# Patient Record
Sex: FEMALE | Race: WHITE | HISPANIC OR LATINO | Employment: STUDENT | URBAN - METROPOLITAN AREA
[De-identification: names, ages, dates, MRNs, and addresses within clinical notes are randomized per-mention and may not be internally consistent; named-entity substitution may affect disease eponyms.]

---
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
## 2021-04-12 ENCOUNTER — PATIENT MESSAGE (OUTPATIENT)
Dept: RESEARCH | Facility: HOSPITAL | Age: 21
End: 2021-04-12

## 2021-07-02 ENCOUNTER — OFFICE VISIT (OUTPATIENT)
Dept: SPORTS MEDICINE | Facility: CLINIC | Age: 21
End: 2021-07-02
Payer: MEDICAID

## 2021-07-02 ENCOUNTER — TELEPHONE (OUTPATIENT)
Dept: SPORTS MEDICINE | Facility: CLINIC | Age: 21
End: 2021-07-02

## 2021-07-02 ENCOUNTER — HOSPITAL ENCOUNTER (OUTPATIENT)
Dept: RADIOLOGY | Facility: HOSPITAL | Age: 21
Discharge: HOME | End: 2021-07-02
Attending: FAMILY MEDICINE
Payer: MEDICAID

## 2021-07-02 VITALS — BODY MASS INDEX: 29.79 KG/M2 | WEIGHT: 179 LBS | TEMPERATURE: 97 F

## 2021-07-02 DIAGNOSIS — M79.642 CHRONIC HAND PAIN, LEFT: ICD-10-CM

## 2021-07-02 DIAGNOSIS — R29.898 DECREASED GRIP STRENGTH OF LEFT HAND: ICD-10-CM

## 2021-07-02 DIAGNOSIS — M25.632 DECREASED RANGE OF MOTION OF LEFT WRIST: ICD-10-CM

## 2021-07-02 DIAGNOSIS — G89.29 CHRONIC HAND PAIN, LEFT: ICD-10-CM

## 2021-07-02 DIAGNOSIS — G89.29 CHRONIC PAIN OF LEFT WRIST: Primary | ICD-10-CM

## 2021-07-02 DIAGNOSIS — M25.532 CHRONIC PAIN OF LEFT WRIST: Primary | ICD-10-CM

## 2021-07-02 DIAGNOSIS — R52 MECHANICAL PAIN: ICD-10-CM

## 2021-07-02 PROCEDURE — 73130 X-RAY EXAM OF HAND: CPT | Mod: TC,LT

## 2021-07-02 PROCEDURE — 73130 X-RAY EXAM OF HAND: CPT | Mod: 26,LT,, | Performed by: RADIOLOGY

## 2021-07-02 PROCEDURE — 99999 PR PBB SHADOW E&M-EST. PATIENT-LVL III: CPT | Mod: PBBFAC,,, | Performed by: FAMILY MEDICINE

## 2021-07-02 PROCEDURE — 99214 OFFICE O/P EST MOD 30 MIN: CPT | Mod: S$PBB,,, | Performed by: FAMILY MEDICINE

## 2021-07-02 PROCEDURE — 99999 PR PBB SHADOW E&M-EST. PATIENT-LVL III: ICD-10-PCS | Mod: PBBFAC,,, | Performed by: FAMILY MEDICINE

## 2021-07-02 PROCEDURE — 73130 XR HAND COMPLETE 3 VIEW LEFT: ICD-10-PCS | Mod: 26,LT,, | Performed by: RADIOLOGY

## 2021-07-02 PROCEDURE — 99214 PR OFFICE/OUTPT VISIT, EST, LEVL IV, 30-39 MIN: ICD-10-PCS | Mod: S$PBB,,, | Performed by: FAMILY MEDICINE

## 2021-07-02 PROCEDURE — 99213 OFFICE O/P EST LOW 20 MIN: CPT | Mod: PBBFAC | Performed by: FAMILY MEDICINE

## 2021-07-07 ENCOUNTER — TELEPHONE (OUTPATIENT)
Dept: SPORTS MEDICINE | Facility: CLINIC | Age: 21
End: 2021-07-07

## 2021-07-10 ENCOUNTER — HOSPITAL ENCOUNTER (OUTPATIENT)
Dept: RADIOLOGY | Facility: OTHER | Age: 21
Discharge: HOME | End: 2021-07-10
Attending: FAMILY MEDICINE
Payer: MEDICAID

## 2021-07-10 DIAGNOSIS — R52 MECHANICAL PAIN: ICD-10-CM

## 2021-07-10 DIAGNOSIS — G89.29 CHRONIC HAND PAIN, LEFT: ICD-10-CM

## 2021-07-10 DIAGNOSIS — M79.642 CHRONIC HAND PAIN, LEFT: ICD-10-CM

## 2021-07-10 PROCEDURE — 73221 MRI JOINT UPR EXTREM W/O DYE: CPT | Mod: TC,LT

## 2021-07-10 PROCEDURE — 73221 MRI WRIST WITHOUT CONTRAST LEFT: ICD-10-PCS | Mod: 26,LT,, | Performed by: RADIOLOGY

## 2021-07-10 PROCEDURE — 73221 MRI JOINT UPR EXTREM W/O DYE: CPT | Mod: 26,LT,, | Performed by: RADIOLOGY

## 2021-07-12 ENCOUNTER — OFFICE VISIT (OUTPATIENT)
Dept: SPORTS MEDICINE | Facility: CLINIC | Age: 21
End: 2021-07-12
Payer: MEDICAID

## 2021-07-12 DIAGNOSIS — Z00.00 EXAMINATION: Primary | ICD-10-CM

## 2021-07-12 PROCEDURE — 99499 NO LOS: ICD-10-PCS | Mod: 95,,, | Performed by: FAMILY MEDICINE

## 2021-07-12 PROCEDURE — 99499 UNLISTED E&M SERVICE: CPT | Mod: 95,,, | Performed by: FAMILY MEDICINE

## 2021-07-14 ENCOUNTER — TELEPHONE (OUTPATIENT)
Dept: SPORTS MEDICINE | Facility: CLINIC | Age: 21
End: 2021-07-14

## 2023-07-08 ENCOUNTER — HOSPITAL ENCOUNTER (EMERGENCY)
Facility: HOSPITAL | Age: 23
Discharge: HOME | End: 2023-07-08
Attending: EMERGENCY MEDICINE
Payer: MEDICAID

## 2023-07-08 VITALS
HEIGHT: 67 IN | TEMPERATURE: 99 F | BODY MASS INDEX: 27.47 KG/M2 | SYSTOLIC BLOOD PRESSURE: 118 MMHG | OXYGEN SATURATION: 98 % | HEART RATE: 88 BPM | RESPIRATION RATE: 16 BRPM | DIASTOLIC BLOOD PRESSURE: 68 MMHG | WEIGHT: 175 LBS

## 2023-07-08 DIAGNOSIS — R50.9 LOW GRADE FEVER: ICD-10-CM

## 2023-07-08 DIAGNOSIS — H60.502 ACUTE OTITIS EXTERNA OF LEFT EAR, UNSPECIFIED TYPE: Primary | ICD-10-CM

## 2023-07-08 DIAGNOSIS — H92.02 OTALGIA OF LEFT EAR: ICD-10-CM

## 2023-07-08 PROCEDURE — 25000003 PHARM REV CODE 250: Performed by: PHYSICIAN ASSISTANT

## 2023-07-08 PROCEDURE — 99283 EMERGENCY DEPT VISIT LOW MDM: CPT

## 2023-07-08 RX ORDER — CIPROFLOXACIN AND DEXAMETHASONE 3; 1 MG/ML; MG/ML
4 SUSPENSION/ DROPS AURICULAR (OTIC)
Status: COMPLETED | OUTPATIENT
Start: 2023-07-08 | End: 2023-07-08

## 2023-07-08 RX ORDER — CIPROFLOXACIN AND DEXAMETHASONE 3; 1 MG/ML; MG/ML
4 SUSPENSION/ DROPS AURICULAR (OTIC) 2 TIMES DAILY
Status: DISCONTINUED | OUTPATIENT
Start: 2023-07-08 | End: 2023-07-08

## 2023-07-08 RX ORDER — CIPROFLOXACIN AND DEXAMETHASONE 3; 1 MG/ML; MG/ML
4 SUSPENSION/ DROPS AURICULAR (OTIC) 2 TIMES DAILY
Qty: 7.5 ML | Refills: 0 | Status: SHIPPED | OUTPATIENT
Start: 2023-07-08 | End: 2023-07-18

## 2023-07-08 RX ORDER — ACETAMINOPHEN 500 MG
1000 TABLET ORAL
Status: COMPLETED | OUTPATIENT
Start: 2023-07-08 | End: 2023-07-08

## 2023-07-08 RX ORDER — NEOMYCIN SULFATE, POLYMYXIN B SULFATE, HYDROCORTISONE 3.5; 10000; 1 MG/ML; [USP'U]/ML; MG/ML
4 SOLUTION/ DROPS AURICULAR (OTIC)
Status: DISCONTINUED | OUTPATIENT
Start: 2023-07-08 | End: 2023-07-08

## 2023-07-08 RX ADMIN — CIPROFLOXACIN AND DEXAMETHASONE 4 DROP: 3; 1 SUSPENSION/ DROPS AURICULAR (OTIC) at 01:07

## 2023-07-08 RX ADMIN — ACETAMINOPHEN 1000 MG: 500 TABLET ORAL at 01:07

## 2023-07-08 NOTE — ED NOTES
Sandra Wyatt, a 22 y.o. female presents to the ED via POV w/ complaint of  left hear pain radiating to left side of jaw that began Thursday. States felt like pimple inside of ear and pain increased since then. Did not take temperature at home, but believes she had fever. AAOx4 and ambulatory to intake without difficulty. Family at chairside. Reports taking aleve for pain last night.     Triage note:  Chief Complaint   Patient presents with    Otalgia     Left ear, radiates to left jaw, onset Thursday, also reports subjective fever yesterday with chills     Review of patient's allergies indicates:  No Known Allergies  Past Medical History:   Diagnosis Date    Hand pain     Migraine

## 2023-07-08 NOTE — ED PROVIDER NOTES
Encounter Date: 2023       History     Chief Complaint   Patient presents with    Otalgia     Left ear, radiates to left jaw, onset Thursday, also reports subjective fever yesterday with chills     Patient is a 22-year-old female.  She has a past medical history significant for chronic migraines and chronic right hand pain status post multiple middle finger surgeries.  She presents to the ER for an urgent evaluation due to acute atraumatic left-sided otalgia.  She states that she began having an earache 2 days ago.  She reports having associated chills.  She tried taking Aleve and  Cortisporin otic drops for her symptoms last night, but denies any attempted treatment today.  The  eardrops were from a previous ear infection in 2018.  She states that any palpation of her ear exacerbates the pain.  She states that the pain seems to radiate along her left jaw.  She denies any toothache or dental infection.  She denies any facial swelling.  She denies any lymphadenopathy.  She denies any otorrhea.  She denies any posterior auricular pain.  She denies any hearing change, tinnitus, or vertigo.  She denies current pregnancy.    Review of patient's allergies indicates:  No Known Allergies  Past Medical History:   Diagnosis Date    Hand pain     Migraine      Past Surgical History:   Procedure Laterality Date    FINGER FRACTURE SURGERY  2018    FINGER SURGERY       No family history on file.  Social History     Tobacco Use    Smoking status: Never     Review of Systems   Constitutional:  Positive for chills.   HENT:  Positive for ear pain. Negative for congestion, dental problem, drooling, ear discharge, facial swelling, mouth sores, postnasal drip, sinus pressure, sinus pain, sore throat, tinnitus, trouble swallowing and voice change.    Eyes:  Negative for visual disturbance.   Respiratory:  Negative for cough, shortness of breath, wheezing and stridor.    Cardiovascular:  Negative for chest pain,  palpitations and leg swelling.   Gastrointestinal:  Negative for abdominal pain, diarrhea, nausea and vomiting.   Musculoskeletal:  Negative for back pain, myalgias and neck pain.   Skin:  Negative for color change, rash and wound.   Allergic/Immunologic: Negative for immunocompromised state.   Neurological:  Negative for dizziness, syncope, weakness, light-headedness, numbness and headaches.   Hematological:  Negative for adenopathy.   Psychiatric/Behavioral:  Negative for confusion.      Physical Exam     Initial Vitals [07/08/23 1224]   BP Pulse Resp Temp SpO2   135/66 92 20 100.1 °F (37.8 °C) 99 %      MAP       --         Physical Exam    Nursing note and vitals reviewed.  Constitutional: She appears well-developed and well-nourished. She is not diaphoretic.   HENT:   Head: Normocephalic.   Benign oropharynx.  No facial swelling.  No sinus tenderness.  No obvious odontogenic infection.  Full range of motion of mandible observed  Right otic exam unremarkable.  Left otic exam notable for significant tragal tenderness and pain to ear speculum; moderate swelling and erythema of external auditory canal noted, unable to completely visualize TM, visible portion of TM appears to be intact; no foreign body observed; no otorrhea/drainage; no mastoid tenderness; no preauricular abscess appreciated.   Eyes: Conjunctivae are normal. Pupils are equal, round, and reactive to light.   No periorbital swelling.   Neck: Neck supple.   No nuchal rigidity.  No mass.  No cervical adenopathy.   Cardiovascular:  Normal rate.           Pulmonary/Chest: No respiratory distress. She has no wheezes.   Abdominal: She exhibits no distension.   Musculoskeletal:         General: Normal range of motion.      Cervical back: Neck supple.     Neurological: She is alert and oriented to person, place, and time. She has normal strength.   Skin: Skin is warm and dry. No rash noted.   Psychiatric: She has a normal mood and affect. Her behavior is  normal.       ED Course   Procedures  Labs Reviewed   HIV 1 / 2 ANTIBODY   HEPATITIS C ANTIBODY          Imaging Results    None          Medications   ciprofloxacin-dexAMETHasone 0.3-0.1% otic suspension 4 drop (has no administration in time range)   acetaminophen tablet 1,000 mg (1,000 mg Oral Given 7/8/23 1305)     Medical Decision Making:   History:   Old Medical Records: I decided to obtain old medical records.  Initial Assessment:   22 year-old female presenting to the ER for an urgent evaluation due to acute atraumatic left otalgia times 2 days  Differential Diagnosis:   Otitis externa, otitis media, mastoiditis, cholesteatoma, serous otitis, abscess/cellulitis, etc.  ED Management:  Vital signs noted, elevated temperature of a 100.1° noted, acetaminophen 1000 mg p.o. given for pain and fever   Case discussed with the ER attending physician who recommend Cipro-dex otic solution  Cipro-dex otic solution administered in the ER, and prescription for same provided   Ambulatory referral to ENT Clinic ordered due to recurrent otic infections, patient instructed to follow up at next available  Patient instructed to take Tylenol as directed as needed for any fever or pain   Patient instructed to follow up with her primary care physician within the next 1-2 days for re-evaluation and further management   Patient instructed to return to the ER promptly if unimproved or if worse in any way                        Clinical Impression:   Final diagnoses:  [H60.502] Acute otitis externa of left ear, unspecified type (Primary)  [H92.02] Otalgia of left ear  [R50.9] Low grade fever        ED Disposition Condition    Discharge Stable          ED Prescriptions       Medication Sig Dispense Start Date End Date Auth. Provider    ciprofloxacin-dexAMETHasone 0.3-0.1% (CIPRODEX) 0.3-0.1 % DrpS Place 4 drops into the left ear 2 (two) times daily. for 10 days 7.5 mL 7/8/2023 7/18/2023 Nir Mckoy PA-C          Follow-up  Information       Follow up With Specialties Details Why Contact Info Additional Information    Marybel Quintero MD Pediatrics Schedule an appointment as soon as possible for a visit in 2 days Follow up with your primary care physician within the next 1-2 days for re-evaluation and further management.  Take Tylenol as directed for any fever or pain. [address]  [place] [address]  [phone]       [facility] - [facility] [place] Ot[facility]ology Schedule an appointment as soon as possible for a visit  Follow up with [facility] at next available [address] [address] [phone] Ear[facility] Th[facility] - Main [facility], 4th Floor Please park in [facility] and use Clinic elevator    [facility] - Emergency Dept Emergency Medicine  Return to the emergency room immediately if unimproved or if worse in any way [address] [address] [phone]              Nir Mckoy PA-C  07/08/23 4865

## 2023-07-12 ENCOUNTER — OFFICE VISIT (OUTPATIENT)
Dept: OTOLARYNGOLOGY | Facility: CLINIC | Age: 23
End: 2023-07-12
Payer: MEDICAID

## 2023-07-12 VITALS
WEIGHT: 176.31 LBS | DIASTOLIC BLOOD PRESSURE: 63 MMHG | SYSTOLIC BLOOD PRESSURE: 112 MMHG | BODY MASS INDEX: 27.61 KG/M2 | HEART RATE: 70 BPM

## 2023-07-12 DIAGNOSIS — H60.502 ACUTE OTITIS EXTERNA OF LEFT EAR, UNSPECIFIED TYPE: ICD-10-CM

## 2023-07-12 DIAGNOSIS — H92.02 OTALGIA OF LEFT EAR: ICD-10-CM

## 2023-07-12 DIAGNOSIS — H60.312 ACUTE DIFFUSE OTITIS EXTERNA OF LEFT EAR: ICD-10-CM

## 2023-07-12 PROCEDURE — 3074F PR MOST RECENT SYSTOLIC BLOOD PRESSURE < 130 MM HG: ICD-10-PCS | Mod: CPTII,S$GLB,, | Performed by: OTOLARYNGOLOGY

## 2023-07-12 PROCEDURE — 3008F PR BODY MASS INDEX (BMI) DOCUMENTED: ICD-10-PCS | Mod: CPTII,S$GLB,, | Performed by: OTOLARYNGOLOGY

## 2023-07-12 PROCEDURE — 3078F PR MOST RECENT DIASTOLIC BLOOD PRESSURE < 80 MM HG: ICD-10-PCS | Mod: CPTII,S$GLB,, | Performed by: OTOLARYNGOLOGY

## 2023-07-12 PROCEDURE — 3008F BODY MASS INDEX DOCD: CPT | Mod: CPTII,S$GLB,, | Performed by: OTOLARYNGOLOGY

## 2023-07-12 PROCEDURE — 3078F DIAST BP <80 MM HG: CPT | Mod: CPTII,S$GLB,, | Performed by: OTOLARYNGOLOGY

## 2023-07-12 PROCEDURE — 99203 OFFICE O/P NEW LOW 30 MIN: CPT | Mod: S$GLB,,, | Performed by: OTOLARYNGOLOGY

## 2023-07-12 PROCEDURE — 3074F SYST BP LT 130 MM HG: CPT | Mod: CPTII,S$GLB,, | Performed by: OTOLARYNGOLOGY

## 2023-07-12 PROCEDURE — 99203 PR OFFICE/OUTPT VISIT, NEW, LEVL III, 30-44 MIN: ICD-10-PCS | Mod: S$GLB,,, | Performed by: OTOLARYNGOLOGY

## 2023-07-12 NOTE — PATIENT INSTRUCTIONS
Continue Ciprodex and Augmentin.    Keep ear dry.    Do not insert Q-tips, etc.    Follow up 2 weeks.

## 2023-07-26 ENCOUNTER — TELEPHONE (OUTPATIENT)
Dept: OTOLARYNGOLOGY | Facility: CLINIC | Age: 23
End: 2023-07-26

## 2023-07-26 ENCOUNTER — OFFICE VISIT (OUTPATIENT)
Dept: OTOLARYNGOLOGY | Facility: CLINIC | Age: 23
End: 2023-07-26
Payer: MEDICAID

## 2023-07-26 VITALS
DIASTOLIC BLOOD PRESSURE: 68 MMHG | BODY MASS INDEX: 27.34 KG/M2 | HEART RATE: 77 BPM | HEIGHT: 67 IN | TEMPERATURE: 98 F | SYSTOLIC BLOOD PRESSURE: 100 MMHG | WEIGHT: 174.19 LBS

## 2023-07-26 DIAGNOSIS — Z78.9 HISTORY OF EXCESSIVE CERUMEN: ICD-10-CM

## 2023-07-26 DIAGNOSIS — H92.02 LEFT EAR PAIN: ICD-10-CM

## 2023-07-26 DIAGNOSIS — H60.312 ACUTE DIFFUSE OTITIS EXTERNA OF LEFT EAR: ICD-10-CM

## 2023-07-26 PROCEDURE — 3078F PR MOST RECENT DIASTOLIC BLOOD PRESSURE < 80 MM HG: ICD-10-PCS | Mod: CPTII,S$GLB,, | Performed by: OTOLARYNGOLOGY

## 2023-07-26 PROCEDURE — 99213 OFFICE O/P EST LOW 20 MIN: CPT | Mod: S$GLB,,, | Performed by: OTOLARYNGOLOGY

## 2023-07-26 PROCEDURE — 1159F MED LIST DOCD IN RCRD: CPT | Mod: CPTII,S$GLB,, | Performed by: OTOLARYNGOLOGY

## 2023-07-26 PROCEDURE — 3074F SYST BP LT 130 MM HG: CPT | Mod: CPTII,S$GLB,, | Performed by: OTOLARYNGOLOGY

## 2023-07-26 PROCEDURE — 3074F PR MOST RECENT SYSTOLIC BLOOD PRESSURE < 130 MM HG: ICD-10-PCS | Mod: CPTII,S$GLB,, | Performed by: OTOLARYNGOLOGY

## 2023-07-26 PROCEDURE — 1160F PR REVIEW ALL MEDS BY PRESCRIBER/CLIN PHARMACIST DOCUMENTED: ICD-10-PCS | Mod: CPTII,S$GLB,, | Performed by: OTOLARYNGOLOGY

## 2023-07-26 PROCEDURE — 99213 PR OFFICE/OUTPT VISIT, EST, LEVL III, 20-29 MIN: ICD-10-PCS | Mod: S$GLB,,, | Performed by: OTOLARYNGOLOGY

## 2023-07-26 PROCEDURE — 3008F BODY MASS INDEX DOCD: CPT | Mod: CPTII,S$GLB,, | Performed by: OTOLARYNGOLOGY

## 2023-07-26 PROCEDURE — 3078F DIAST BP <80 MM HG: CPT | Mod: CPTII,S$GLB,, | Performed by: OTOLARYNGOLOGY

## 2023-07-26 PROCEDURE — 1160F RVW MEDS BY RX/DR IN RCRD: CPT | Mod: CPTII,S$GLB,, | Performed by: OTOLARYNGOLOGY

## 2023-07-26 PROCEDURE — 3008F PR BODY MASS INDEX (BMI) DOCUMENTED: ICD-10-PCS | Mod: CPTII,S$GLB,, | Performed by: OTOLARYNGOLOGY

## 2023-07-26 PROCEDURE — 1159F PR MEDICATION LIST DOCUMENTED IN MEDICAL RECORD: ICD-10-PCS | Mod: CPTII,S$GLB,, | Performed by: OTOLARYNGOLOGY

## 2023-07-26 RX ORDER — CIPROFLOXACIN AND DEXAMETHASONE 3; 1 MG/ML; MG/ML
4 SUSPENSION/ DROPS AURICULAR (OTIC) 2 TIMES DAILY
COMMUNITY

## 2023-07-26 NOTE — TELEPHONE ENCOUNTER
----- Message from Yeny Booth sent at 7/25/2023  3:40 PM CDT -----  Regarding: pt call back  Patient Returning Call Who Called: MATT KNIGHT [[id number]]      Who Left Message for Patient: MONTANA CHEY R [[id number]] (ES)      Does the patient know what this is regarding? is returning call about appt, please advise.       Best Call Back Number: [phone]        Additional Information

## 2023-07-26 NOTE — PATIENT INSTRUCTIONS
Stop ear drops.    Keep ear dry one more week, then resume usual activities.    Proper ear care as reviewed.    Follow up if symptoms, problems and 6 months.

## 2023-07-26 NOTE — PROGRESS NOTES
Subjective:       Patient ID: Sandra Wyatt is a 23 y.o. female.    Chief Complaint: left ear Infection (Had urgent care visit for this  and PCP visit 7/10.)    She is a new patient here today regarding recent left ear problems.  Began with pain the evening of 2023 which worsened the following day with tenderness and warmth.  Tried some  Cortisporin drops without relief.  Went to urgent care mid day on 2023 and sent out on Ciprodex drops.  Pain began to decrease as of the evening of 2023.  Had PCP check her ear on 07/10/2023 and added Augmentin.  Left ear is overall improved with regard to pain and tenderness though not fully resolved, and continues to note muffled hearing AS.  Swimming on 2023.  No URI symptoms.  No air travel.  Few ear infections in the past.  No tubes.  No nasal or throat or other otolaryngologic complaints.  Otherwise in good health except for history of migraines and right ring finger problems and surgeries.  No routine daily meds.  No known allergies.          Review of Systems     Constitutional: Positive for fever.      HENT: Positive for ear pain and hearing loss.  Negative for ear discharge, sore throat and stuffy nose.      Respiratory:  Negative for cough and shortness of breath.      Cardiovascular:  Negative for chest pain.     Gastrointestinal:  Negative for abdominal pain.     Neurological: Negative for dizziness.                Objective:        Vitals:    23 1649   BP: 112/63   Pulse: 70     Body mass index is 27.61 kg/m².  Physical Exam  Constitutional:       General: She is not in acute distress.     Appearance: She is well-developed.   HENT:      Head: Normocephalic and atraumatic.      Right Ear: Tympanic membrane, ear canal and external ear normal.      Left Ear: External ear normal.      Ears:      Comments:   Left canal swollen, tender, pink, and partially occluded by cerumen, drops, debris.  This was gently cleared with  "suction.  TM partially visualized due to residual edema and is intact and without erythema.     Nose: No nasal deformity, mucosal edema or rhinorrhea.      Mouth/Throat:      Mouth: Mucous membranes are moist.      Pharynx: No pharyngeal swelling, oropharyngeal exudate or posterior oropharyngeal erythema.   Neck:      Trachea: Phonation normal.   Pulmonary:      Effort: Pulmonary effort is normal. No respiratory distress.   Musculoskeletal:      Cervical back: Neck supple.   Lymphadenopathy:      Cervical: No cervical adenopathy.   Skin:     General: Skin is warm and dry.   Neurological:      Mental Status: She is alert and oriented to person, place, and time.   Psychiatric:         Speech: Speech normal.         Behavior: Behavior normal.         Tests / Results:    Urgent care and PCP notes reviewed.      CT head done 2019 for headache evaluation report reviewed which notes "cerumen lodged in left canal", mastoids and paranasal sinuses clear.          Assessment:       1. Acute diffuse otitis externa of left ear    2. Acute otitis externa of left ear, unspecified type    3. Otalgia of left ear        Plan:       Reviewed all above and considerations and recommendations and answered questions.    Continue Ciprodex drops and reviewed use.    Continue Augmentin.    Keep ear dry.    Do not insert Q-tips etc.   Follow-up 2 weeks.              "

## 2023-10-16 NOTE — PROGRESS NOTES
Subjective:       Patient ID: Sandra Wyatt is a 23 y.o. female.    Chief Complaint: Follow-up    Returns for follow-up last seen 07/12/2023, notes reviewed.  Had left ear pain due to external otitis and excessive cerumen noted as well which was gently cleared with suction.  After last visit increased Ciprodex to 3 times a day initially and then twice daily.  Finished course of oral Augmentin.  Still has some drops which she has been using every evening.  However all ear symptoms have resolved and feels back to normal.  No pain, fullness, muffling, difficulty hearing, tinnitus, drainage, etc.  No nasal or throat or other otolaryngologic complaints.  No complaints and states doing well.          Review of Systems     Constitutional: Negative for fever.      HENT: Negative for ear discharge, ear pain, hearing loss, ringing in the ears, sore throat and stuffy nose.      Respiratory:  Negative for cough and shortness of breath.      Cardiovascular:  Negative for chest pain.     Gastrointestinal:  Negative for abdominal pain.     Neurological: Negative for dizziness and headaches.                Objective:        Vitals:    07/26/23 1041   BP: 100/68   Pulse: 77   Temp: 98.2 °F (36.8 °C)     Body mass index is 27.28 kg/m².  Physical Exam  Constitutional:       General: She is not in acute distress.     Appearance: She is well-developed.   HENT:      Head: Normocephalic and atraumatic.      Right Ear: Tympanic membrane, ear canal and external ear normal. No decreased hearing noted. No swelling or tenderness. No middle ear effusion. Tympanic membrane is not perforated or erythematous.      Left Ear: Tympanic membrane, ear canal and external ear normal. No decreased hearing noted. No swelling or tenderness.  No middle ear effusion. Tympanic membrane is not perforated or erythematous.      Ears:      Cancino exam findings: Does not lateralize.     Right Rinne: AC > BC.     Left Rinne: AC > BC.     Nose: No nasal  deformity, mucosal edema or rhinorrhea.      Mouth/Throat:      Mouth: Mucous membranes are moist.      Pharynx: No pharyngeal swelling, oropharyngeal exudate or posterior oropharyngeal erythema.   Neck:      Trachea: Phonation normal.   Pulmonary:      Effort: Pulmonary effort is normal. No respiratory distress.   Musculoskeletal:      Cervical back: Neck supple.   Lymphadenopathy:      Cervical: No cervical adenopathy.   Skin:     General: Skin is warm and dry.   Neurological:      Mental Status: She is alert and oriented to person, place, and time.   Psychiatric:         Speech: Speech normal.         Behavior: Behavior normal.         Tests / Results:        Assessment:       1. Hx of left ear pain    2. Hx of acute diffuse otitis externa of left ear    3. History of excessive cerumen        Plan:       Doing well as above and all resolved.    Stop ear drops.    Keep ear dry one more week, then resume usual activities.    Proper ear care as reviewed.      Follow-up if symptoms or problems and otherwise 6 months.

## 2024-01-24 ENCOUNTER — OFFICE VISIT (OUTPATIENT)
Dept: OTOLARYNGOLOGY | Facility: CLINIC | Age: 24
End: 2024-01-24
Payer: MEDICAID

## 2024-01-24 VITALS
WEIGHT: 178.19 LBS | HEART RATE: 73 BPM | HEIGHT: 67 IN | DIASTOLIC BLOOD PRESSURE: 66 MMHG | BODY MASS INDEX: 27.97 KG/M2 | TEMPERATURE: 98 F | SYSTOLIC BLOOD PRESSURE: 101 MMHG

## 2024-01-24 DIAGNOSIS — H60.312 ACUTE DIFFUSE OTITIS EXTERNA OF LEFT EAR: ICD-10-CM

## 2024-01-24 DIAGNOSIS — Z78.9 HISTORY OF EXCESSIVE CERUMEN: ICD-10-CM

## 2024-01-24 PROCEDURE — 1159F MED LIST DOCD IN RCRD: CPT | Mod: CPTII,S$GLB,, | Performed by: OTOLARYNGOLOGY

## 2024-01-24 PROCEDURE — 3074F SYST BP LT 130 MM HG: CPT | Mod: CPTII,S$GLB,, | Performed by: OTOLARYNGOLOGY

## 2024-01-24 PROCEDURE — 99213 OFFICE O/P EST LOW 20 MIN: CPT | Mod: S$GLB,,, | Performed by: OTOLARYNGOLOGY

## 2024-01-24 PROCEDURE — 3078F DIAST BP <80 MM HG: CPT | Mod: CPTII,S$GLB,, | Performed by: OTOLARYNGOLOGY

## 2024-01-24 PROCEDURE — 1160F RVW MEDS BY RX/DR IN RCRD: CPT | Mod: CPTII,S$GLB,, | Performed by: OTOLARYNGOLOGY

## 2024-01-24 PROCEDURE — 3008F BODY MASS INDEX DOCD: CPT | Mod: CPTII,S$GLB,, | Performed by: OTOLARYNGOLOGY

## 2024-01-24 RX ORDER — NAPROXEN SODIUM 220 MG
220 TABLET ORAL
COMMUNITY

## 2024-04-10 NOTE — PROGRESS NOTES
Subjective:       Patient ID: Sandra Wyatt is a 23 y.o. female.    Chief Complaint: Follow-up (6 month follow up)    Returns for scheduled follow-up to check ears.  Has history of impacted cerumen, excessive cerumen and external otitis.  Last seen 07/12/2023 and 07/26/2023, notes reviewed.  Since then ears have been doing well with no complaints versus occasionally a little itchy.  No other dermatologic complaints/history.  Stopped using Q-tips.  No ear inserts.  No other otologic or otolaryngologic complaints.          Review of Systems     Constitutional: Negative for appetite change, chills, fatigue, fever and unexpected weight loss.      HENT: Negative for ear discharge, ear infection, ear pain, facial swelling, hearing loss, mouth sores, nosebleeds, postnasal drip, ringing in the ears, runny nose, sinus infection, sinus pressure, sore throat, stuffy nose, tonsil infection, dental problems, trouble swallowing and voice change.      Eyes:  Positive for eye itching.     Respiratory:  Negative for cough, shortness of breath, sleep apnea, snoring and wheezing.      Cardiovascular:  Negative for chest pain, foot swelling, irregular heartbeat and swollen veins.     Gastrointestinal:  Negative for abdominal pain, acid reflux, constipation, diarrhea, heartburn and vomiting.     Genitourinary: Negative for difficulty urinating, sexual problems and frequent urination.     Musc: Negative for aching joints, aching muscles, back pain and neck pain.     Skin: Negative for rash.     Allergy: Negative for food allergies and seasonal allergies.     Endocrine: Negative for cold intolerance and heat intolerance.      Neurological: Negative for dizziness, headaches, light-headedness, seizures and tremors.      Hematologic: Negative for bruises/bleeds easily and swollen glands.      Psychiatric: Negative for decreased concentration, depression, nervous/anxious and sleep disturbance.          Patient's self populated ROS  above noted.          Objective:        Vitals:    01/24/24 0901   BP: 101/66   Pulse: 73   Temp: 98.1 °F (36.7 °C)     Body mass index is 27.91 kg/m².  Physical Exam  Constitutional:       General: She is not in acute distress.     Appearance: Normal appearance.   HENT:      Head: Normocephalic and atraumatic.      Right Ear: Tympanic membrane, ear canal and external ear normal.      Left Ear: Tympanic membrane, ear canal and external ear normal.      Nose: No nasal deformity, mucosal edema or rhinorrhea.      Mouth/Throat:      Mouth: Mucous membranes are moist.      Pharynx: No pharyngeal swelling, oropharyngeal exudate or posterior oropharyngeal erythema.   Neck:      Trachea: Phonation normal.   Pulmonary:      Effort: Pulmonary effort is normal. No respiratory distress.   Musculoskeletal:      Cervical back: Neck supple.   Lymphadenopathy:      Cervical: No cervical adenopathy.   Skin:     General: Skin is warm and dry.   Neurological:      Mental Status: She is alert and oriented to person, place, and time.   Psychiatric:         Speech: Speech normal.         Behavior: Behavior normal.         Tests / Results:        Assessment:       1. History of excessive cerumen    2. Hx of acute diffuse otitis externa of left ear        Plan:       Reviewed above history, findings, options and answered questions.  Proper ear care reviewed.  Baby oil drops as needed.  Follow-up discussed and will return as needed.